# Patient Record
Sex: MALE | Race: WHITE | Employment: FULL TIME | URBAN - NONMETROPOLITAN AREA
[De-identification: names, ages, dates, MRNs, and addresses within clinical notes are randomized per-mention and may not be internally consistent; named-entity substitution may affect disease eponyms.]

---

## 2018-04-26 ENCOUNTER — OFFICE VISIT (OUTPATIENT)
Dept: PRIMARY CARE CLINIC | Age: 48
End: 2018-04-26
Payer: COMMERCIAL

## 2018-04-26 VITALS
TEMPERATURE: 98 F | DIASTOLIC BLOOD PRESSURE: 74 MMHG | HEIGHT: 68 IN | BODY MASS INDEX: 38.43 KG/M2 | SYSTOLIC BLOOD PRESSURE: 128 MMHG | OXYGEN SATURATION: 98 % | WEIGHT: 253.6 LBS | HEART RATE: 70 BPM

## 2018-04-26 DIAGNOSIS — S61.210A LACERATION OF RIGHT INDEX FINGER WITHOUT FOREIGN BODY WITHOUT DAMAGE TO NAIL, INITIAL ENCOUNTER: Primary | ICD-10-CM

## 2018-04-26 PROCEDURE — 99214 OFFICE O/P EST MOD 30 MIN: CPT | Performed by: FAMILY MEDICINE

## 2018-04-26 PROCEDURE — 90715 TDAP VACCINE 7 YRS/> IM: CPT | Performed by: FAMILY MEDICINE

## 2018-04-26 PROCEDURE — 90471 IMMUNIZATION ADMIN: CPT | Performed by: FAMILY MEDICINE

## 2018-04-26 RX ORDER — TAMSULOSIN HYDROCHLORIDE 0.4 MG/1
0.4 CAPSULE ORAL DAILY
COMMUNITY

## 2018-04-26 ASSESSMENT — PATIENT HEALTH QUESTIONNAIRE - PHQ9
2. FEELING DOWN, DEPRESSED OR HOPELESS: 0
SUM OF ALL RESPONSES TO PHQ QUESTIONS 1-9: 0
1. LITTLE INTEREST OR PLEASURE IN DOING THINGS: 0
SUM OF ALL RESPONSES TO PHQ9 QUESTIONS 1 & 2: 0

## 2018-04-26 ASSESSMENT — ENCOUNTER SYMPTOMS
ALLERGIC/IMMUNOLOGIC NEGATIVE: 1
RESPIRATORY NEGATIVE: 1
EYES NEGATIVE: 1
GASTROINTESTINAL NEGATIVE: 1

## 2021-04-02 ENCOUNTER — OFFICE VISIT (OUTPATIENT)
Dept: FAMILY MEDICINE CLINIC | Age: 51
End: 2021-04-02
Payer: COMMERCIAL

## 2021-04-02 VITALS
HEIGHT: 69 IN | SYSTOLIC BLOOD PRESSURE: 134 MMHG | DIASTOLIC BLOOD PRESSURE: 80 MMHG | WEIGHT: 263.2 LBS | TEMPERATURE: 97.8 F | BODY MASS INDEX: 38.98 KG/M2 | OXYGEN SATURATION: 98 % | HEART RATE: 82 BPM

## 2021-04-02 DIAGNOSIS — H66.001 NON-RECURRENT ACUTE SUPPURATIVE OTITIS MEDIA OF RIGHT EAR WITHOUT SPONTANEOUS RUPTURE OF TYMPANIC MEMBRANE: Primary | ICD-10-CM

## 2021-04-02 PROCEDURE — 99213 OFFICE O/P EST LOW 20 MIN: CPT | Performed by: NURSE PRACTITIONER

## 2021-04-02 RX ORDER — AMOXICILLIN 875 MG/1
875 TABLET, COATED ORAL 2 TIMES DAILY
Qty: 14 TABLET | Refills: 0 | Status: SHIPPED | OUTPATIENT
Start: 2021-04-02 | End: 2021-04-09

## 2021-04-02 ASSESSMENT — PATIENT HEALTH QUESTIONNAIRE - PHQ9
SUM OF ALL RESPONSES TO PHQ QUESTIONS 1-9: 0
SUM OF ALL RESPONSES TO PHQ9 QUESTIONS 1 & 2: 0
SUM OF ALL RESPONSES TO PHQ QUESTIONS 1-9: 0

## 2021-04-02 ASSESSMENT — ENCOUNTER SYMPTOMS
SORE THROAT: 0
RHINORRHEA: 0
COUGH: 0

## 2021-04-02 NOTE — PATIENT INSTRUCTIONS
Amoxil twice daily as directed. Follow up with primary care provider in 1 to 2 days if needed. Patient Education        Ear Infection (Otitis Media): Care Instructions  Overview     An ear infection may start with a cold and affect the middle ear (otitis media). It can hurt a lot. Most ear infections clear up on their own in a couple of days and do not need antibiotics. Also, antibiotics do not work against viruses, which may be the cause of your infection. Regular doses of pain relievers are the best way to reduce your fever and help you feel better. Follow-up care is a key part of your treatment and safety. Be sure to make and go to all appointments, and call your doctor if you are having problems. It's also a good idea to know your test results and keep a list of the medicines you take. How can you care for yourself at home? · Take pain medicines exactly as directed. ? If the doctor gave you a prescription medicine for pain, take it as prescribed. ? If you are not taking a prescription pain medicine, take an over-the-counter medicine, such as acetaminophen (Tylenol), ibuprofen (Advil, Motrin), or naproxen (Aleve). Read and follow all instructions on the label. ? Do not take two or more pain medicines at the same time unless the doctor told you to. Many pain medicines have acetaminophen, which is Tylenol. Too much acetaminophen (Tylenol) can be harmful. · Plan to take a full dose of pain reliever before bedtime. Getting enough sleep will help you get better. · Try a warm, moist washcloth on the ear. It may help relieve pain. · If your doctor prescribed antibiotics, take them as directed. Do not stop taking them just because you feel better. You need to take the full course of antibiotics. When should you call for help?    Call your doctor now or seek immediate medical care if:    · You have new or increasing ear pain.     · You have new or increasing pus or blood draining from your ear.     · You have a fever with a stiff neck or a severe headache. Watch closely for changes in your health, and be sure to contact your doctor if:    · You have new or worse symptoms.     · You are not getting better after taking an antibiotic for 2 days. Where can you learn more? Go to https://chpemartíneweb.F[employer]. org and sign in to your [employer] account. Enter A482 in the [employer] box to learn more about \"Ear Infection (Otitis Media): Care Instructions. \"     If you do not have an account, please click on the \"Sign Up Now\" link. Current as of: December 2, 2020               Content Version: 12.8  © 6914-2362 Healthwise, Incorporated. Care instructions adapted under license by [facility] ([facility]). If you have questions about a medical condition or this instruction, always ask your healthcare professional. [address] any warranty or liability for your use of this information.

## 2021-04-02 NOTE — PROGRESS NOTES
[facility] [place] [address] [address] [facility], APRN-CNP  [address]  Phone:  [phone]  Fax:  [phone]  Suzy Koch is a 48 y.o. male who presents today for his medical conditions/complaints as noted below. Suzy Koch c/o of Otalgia (right ear pain for the past 2 weeks, first time being seen, can still hear)      HPI:     Otalgia   There is pain in the right (right ear has felt different for 2 weeks, started becoming painful yesterday and worsening) ear. This is a new problem. The current episode started yesterday. The problem has been gradually worsening. There has been no fever. The fever has been present for less than 1 day. The pain is at a severity of 4/10. Pertinent negatives include no coughing, ear discharge, headaches, rhinorrhea or sore throat. Hearing loss: just different. He has tried nothing for the symptoms. There is no history of a chronic ear infection, hearing loss or a tympanostomy tube. Wt Readings from Last 3 Encounters:   04/02/21 263 lb 3.2 oz (119.4 kg)   04/26/18 253 lb 9.6 oz (115 kg)       Temp Readings from Last 3 Encounters:   04/02/21 97.8 °F (36.6 °C)   04/26/18 98 °F (36.7 °C) (Tympanic)       BP Readings from Last 3 Encounters:   04/02/21 134/80   04/26/18 128/74       Pulse Readings from Last 3 Encounters:   04/02/21 82   04/26/18 70              Past Medical History:   Diagnosis Date    Asthma       Past Surgical History:   Procedure Laterality Date    HERNIA REPAIR      TONSILLECTOMY       History reviewed. No pertinent family history.   Social History     Tobacco Use    Smoking status: Never Smoker    Smokeless tobacco: Never Used   Substance Use Topics    Alcohol use: No      Current Outpatient Medications   Medication Sig Dispense Refill    amoxicillin (AMOXIL) 875 MG tablet Take 1 tablet by mouth 2 times daily for 7 days 14 tablet 0    tamsulosin (FLOMAX) 0.4 MG capsule Take 0.4 mg by mouth daily      ibuprofen which may be the cause of your infection. Regular doses of pain relievers are the best way to reduce your fever and help you feel better. Follow-up care is a key part of your treatment and safety. Be sure to make and go to all appointments, and call your doctor if you are having problems. It's also a good idea to know your test results and keep a list of the medicines you take. How can you care for yourself at home? · Take pain medicines exactly as directed. ? If the doctor gave you a prescription medicine for pain, take it as prescribed. ? If you are not taking a prescription pain medicine, take an over-the-counter medicine, such as acetaminophen (Tylenol), ibuprofen (Advil, Motrin), or naproxen (Aleve). Read and follow all instructions on the label. ? Do not take two or more pain medicines at the same time unless the doctor told you to. Many pain medicines have acetaminophen, which is Tylenol. Too much acetaminophen (Tylenol) can be harmful. · Plan to take a full dose of pain reliever before bedtime. Getting enough sleep will help you get better. · Try a warm, moist washcloth on the ear. It may help relieve pain. · If your doctor prescribed antibiotics, take them as directed. Do not stop taking them just because you feel better. You need to take the full course of antibiotics. When should you call for help? Call your doctor now or seek immediate medical care if:    · You have new or increasing ear pain.     · You have new or increasing pus or blood draining from your ear.     · You have a fever with a stiff neck or a severe headache. Watch closely for changes in your health, and be sure to contact your doctor if:    · You have new or worse symptoms.     · You are not getting better after taking an antibiotic for 2 days. Where can you learn more? Go to https://chaddyeb.[employer]. org and sign in to your [employer] account.  Enter H919 in the [employer] box to learn more about \"Ear Infection (Otitis Media): Care Instructions. \"     If you do not have an account, please click on the \"Sign Up Now\" link. Current as of: December 2, 2020               Content Version: 12.8  © 2006-2021 Healthwise, Incorporated. Care instructions adapted under license by [facility] ([facility]). If you have questions about a medical condition or this instruction, always ask your healthcare professional. [place] [address] any warranty or liability for your use of this information. Patient/Caregiver instructed on use, benefit, and side effects of prescribed medications. All patient/parent/caregiver questions answered. Patient/parent/caregiver voiced understanding. Reviewed health maintenance. Instructed to continue current medications, diet and exercise. Patient agreed with treatment plan. Follow up as directed.            Electronically signed by SCARLETT Daniels NP on4/2/2021

## 2021-10-05 ENCOUNTER — OFFICE VISIT (OUTPATIENT)
Dept: OTOLARYNGOLOGY | Age: 51
End: 2021-10-05
Payer: COMMERCIAL

## 2021-10-05 VITALS
HEIGHT: 69 IN | HEART RATE: 93 BPM | OXYGEN SATURATION: 97 % | DIASTOLIC BLOOD PRESSURE: 88 MMHG | SYSTOLIC BLOOD PRESSURE: 138 MMHG | WEIGHT: 264 LBS | BODY MASS INDEX: 39.1 KG/M2

## 2021-10-05 DIAGNOSIS — H93.291 ABNORMAL AUDITORY PERCEPTION OF RIGHT EAR: Primary | ICD-10-CM

## 2021-10-05 PROCEDURE — 99204 OFFICE O/P NEW MOD 45 MIN: CPT | Performed by: OTOLARYNGOLOGY

## 2021-10-05 NOTE — PROGRESS NOTES
10/5/2021 2:31 PM EDT  Angela Painter (:  1970) is a 46 y.o. male,New patient, here for evaluation of the following chief complaint(s):  Otalgia (nw pt- pain in rt ear x 7 to 8 months with some hearing loss but sensitve to certain sounds)      ASSESSMENT/PLAN:  1. Abnormal auditory perception of right ear      1. Abnormal auditory perception of right ear  -     External Referral To Audiology  Discussed eustachian tube dysfunction and hyperacusis  Discussed that hyperacusis is commonly associated with lifestyle changes, stress, anxiety, bell's palsy, herpes zoster, superior canal dehiscence   Audiogram    No follow-ups on file. SUBJECTIVE/OBJECTIVE:  HPI   14-year-old man with 7 to 8-month history of change in his right ear. This is the third doctor's office that he has been in for evaluation. He was thought to have an effusion on the right side and was treated with Flonase without any improvement. He describes hearing sensitivity particularly with loud voices in a crowd or multiple voices. The right ear becomes sensitive and sometimes painful. Certain frequencies are very bothersome particularly in lower frequencies. Sometimes he feels like he is not hearing well. He has some mild allergy symptoms but does not take any medication on a regular basis. 4 to 5 years ago he had a fall with head trauma and nausea. He denies tinnitus and vertigo. He has a history of listening to loud music, shooting and hunting. He has routine audiograms for work but the most recent one was about 3 years ago. Specifically the onset of these symptoms seem to be associated with sleeping through an alarm that is very loud that he is used for 20 years. He feels like he slept through the alarm because he had a change in his hearing. It has somewhat improved since that initial event but he has ongoing symptoms that do not feel right on the right side. Frustrated by the ongoing symptoms.      Past Medical History: Diagnosis Date    Asthma      Past Surgical History:   Procedure Laterality Date    HERNIA REPAIR      TONSILLECTOMY       Social History     Tobacco History     Smoking Status  Never Smoker    Smokeless Tobacco Use  Never Used          Alcohol History     Alcohol Use Status  No          Drug Use     Drug Use Status  No          Sexual Activity     Sexually Active  Not Asked              History reviewed. No pertinent family history. Current Outpatient Medications   Medication Instructions    ibuprofen (ADVIL;MOTRIN) 800 mg, Oral, EVERY 8 HOURS PRN    tamsulosin (FLOMAX) 0.4 mg, DAILY     Allergies   Allergen Reactions    No Known Allergies        ENT ROS: positive for - hearing change    General: The patient is found to be alert and normally responsive male. Hearing: grossly normal   Voice: Clear   Skin: The skin has normal colour and turgor. Face: The facial contour is symmetric at rest and with movement. Ears: The pinnae have normal contours. AD: EAC clear, TM intact, no effusion/erythema/retraction   AS: EAC clear, TM intact, no effusion/erythema/retraction   Tympanogram   Type A AU   Eye: The ocular movements are full and symmetric, the conjunctiva is unremarkable; sclera are anicteric, pupillary response is symmetric. No nystagmus is found. Nose:   The external nasal contour is normal  Oral cavity:   The dentition is healthy. The oral mucosa is without lesions;  the tongue is symmetric with full mobility and is without fasciculation. The soft palate is symmetric. The oropharynx is unremarkable. Neck: The neck has a normal contour; no masses are found on palpation      An electronic signature was used to authenticate this note.     --Natalie Parra MD     10/5/2021 2:31 PM EDT

## 2021-11-09 ENCOUNTER — OFFICE VISIT (OUTPATIENT)
Dept: OTOLARYNGOLOGY | Age: 51
End: 2021-11-09
Payer: COMMERCIAL

## 2021-11-09 VITALS
HEART RATE: 85 BPM | OXYGEN SATURATION: 97 % | SYSTOLIC BLOOD PRESSURE: 128 MMHG | DIASTOLIC BLOOD PRESSURE: 78 MMHG | WEIGHT: 267.4 LBS | HEIGHT: 69 IN | BODY MASS INDEX: 39.6 KG/M2 | RESPIRATION RATE: 18 BRPM

## 2021-11-09 DIAGNOSIS — H93.291 ABNORMAL AUDITORY PERCEPTION OF RIGHT EAR: Primary | ICD-10-CM

## 2021-11-09 PROBLEM — N40.1 URINARY HESITANCY DUE TO BENIGN PROSTATIC HYPERPLASIA: Status: ACTIVE | Noted: 2017-12-19

## 2021-11-09 PROBLEM — Z86.19 HISTORY OF HERPES GENITALIS: Status: ACTIVE | Noted: 2019-12-23

## 2021-11-09 PROBLEM — N40.1 BENIGN PROSTATIC HYPERPLASIA WITH WEAK URINARY STREAM: Status: ACTIVE | Noted: 2020-01-27

## 2021-11-09 PROBLEM — R39.11 URINARY HESITANCY DUE TO BENIGN PROSTATIC HYPERPLASIA: Status: ACTIVE | Noted: 2017-12-19

## 2021-11-09 PROBLEM — R39.12 BENIGN PROSTATIC HYPERPLASIA WITH WEAK URINARY STREAM: Status: ACTIVE | Noted: 2020-01-27

## 2021-11-09 PROBLEM — R39.15 URINARY URGENCY: Status: ACTIVE | Noted: 2020-01-27

## 2021-11-09 PROCEDURE — 99213 OFFICE O/P EST LOW 20 MIN: CPT | Performed by: OTOLARYNGOLOGY

## 2021-11-09 NOTE — PROGRESS NOTES
2021 2:31 PM EDT  Rajendra Flores (:  1970) is a 46 y.o. male,New patient, here for evaluation of the following chief complaint(s):  Hearing Problem (5 wk follow up audio in [place])      ASSESSMENT/PLAN:  1. Abnormal auditory perception of right ear      1. Abnormal auditory perception of right ear  -     External Referral To ENT     Discussed eustachian tube dysfunction and hyperacusis and noise induced hearing loss   Discussed that hyperacusis is commonly associated with lifestyle changes, stress, anxiety, bell's palsy, herpes zoster, superior canal dehiscence     Recommend CT temporal bone to evaluate for superior canal dehiscence or any other underlying pathology of the right ear. Patient states that he does not believe a CT scan should be necessary. Explained that there is many areas of the ear that we are not able to see through traditional ear exam.  He continues to be very frustrated by the ongoing symptoms and the inability to explain what is going on beyond findings of the audiogram.  Discussed referral to neuro-otologist for further evaluation. No follow-ups on file. SUBJECTIVE/OBJECTIVE:  HPI   71-year-old man with 7 to 8-month history of change in his right ear. This is the third doctor's office that he has been in for evaluation. He was thought to have an effusion on the right side and was treated with Flonase without any improvement. He describes hearing sensitivity particularly with loud voices in a crowd or multiple voices. The right ear becomes sensitive and sometimes painful. Certain frequencies are very bothersome particularly in lower frequencies. Sometimes he feels like he is not hearing well or a fullness. He has some mild allergy symptoms but does not take any medication on a regular basis. 4 to 5 years ago he had a fall with head trauma and nausea. He denies tinnitus and vertigo. He has a history of listening to loud music, shooting and hunting.   He has routine audiograms for work but the most recent one was about 3 years ago. Specifically the onset of these symptoms seem to be associated with sleeping through an alarm that is very loud that he is used for 20 years. He feels like he slept through the alarm because he had a change in his hearing. It has somewhat improved since that initial event but he has ongoing symptoms that do not feel right on the right side. Frustrated by the ongoing symptoms. Today he follows up after an audiogram.  Audiogram 10/25/2021  Right hearing mild upsloping to normal at 500 Hz through 2000 Hz downsloping to moderate at 4000 Hz upsloping to normal at 8000 Hz with notch at 4000 Hz  Left hearing through 2000 Hz downsloping to moderate at 4000 Hz upsloping to normal at 8000 Hz with notch at 4000 Hz  Word recognition score 100% AU  Type A tympanometry AU    Past Medical History:   Diagnosis Date    Asthma      Past Surgical History:   Procedure Laterality Date    HERNIA REPAIR      TONSILLECTOMY       Social History     Tobacco History     Smoking Status  Never Smoker    Smokeless Tobacco Use  Never Used          Alcohol History     Alcohol Use Status  No          Drug Use     Drug Use Status  No          Sexual Activity     Sexually Active  Not Asked              History reviewed. No pertinent family history. Current Outpatient Medications   Medication Instructions    ibuprofen (ADVIL;MOTRIN) 800 mg, Oral, EVERY 8 HOURS PRN    tamsulosin (FLOMAX) 0.4 mg, DAILY     Allergies   Allergen Reactions    No Known Allergies        ENT ROS: positive for - hearing change    General: The patient is found to be alert and normally responsive male. Hearing: grossly normal   Voice: Clear   Skin: The skin has normal colour and turgor. Face: The facial contour is symmetric at rest and with movement. Ears: The pinnae have normal contours.     AD: EAC clear, TM intact, no effusion/erythema/retraction   AS: EAC clear, TM intact, no effusion/erythema/retraction   Tympanogram   Type A AU   Eye: The ocular movements are full and symmetric, the conjunctiva is unremarkable; sclera are anicteric, pupillary response is symmetric. No nystagmus is found. Nose:   The external nasal contour is normal  Oral cavity:   The dentition is healthy. The oral mucosa is without lesions;  the tongue is symmetric with full mobility and is without fasciculation. The soft palate is symmetric. The oropharynx is unremarkable. Neck: The neck has a normal contour; no masses are found on palpation      An electronic signature was used to authenticate this note.     --Mychal Patricio MD     11/9/2021 2:31 PM EDT

## 2023-12-27 ENCOUNTER — OFFICE VISIT (OUTPATIENT)
Dept: UROLOGY | Age: 53
End: 2023-12-27
Payer: COMMERCIAL

## 2023-12-27 VITALS
SYSTOLIC BLOOD PRESSURE: 138 MMHG | BODY MASS INDEX: 38.36 KG/M2 | HEART RATE: 77 BPM | DIASTOLIC BLOOD PRESSURE: 88 MMHG | OXYGEN SATURATION: 100 % | WEIGHT: 253.13 LBS | HEIGHT: 68 IN

## 2023-12-27 DIAGNOSIS — N13.8 BPH WITH OBSTRUCTION/LOWER URINARY TRACT SYMPTOMS: Primary | ICD-10-CM

## 2023-12-27 DIAGNOSIS — N39.0 RECURRENT UTI: ICD-10-CM

## 2023-12-27 DIAGNOSIS — N40.1 BPH WITH OBSTRUCTION/LOWER URINARY TRACT SYMPTOMS: Primary | ICD-10-CM

## 2023-12-27 DIAGNOSIS — N41.1 CHRONIC PROSTATITIS: ICD-10-CM

## 2023-12-27 PROCEDURE — 99204 OFFICE O/P NEW MOD 45 MIN: CPT | Performed by: UROLOGY

## 2023-12-27 RX ORDER — ALLOPURINOL 100 MG/1
100 TABLET ORAL DAILY
COMMUNITY
Start: 2023-08-09

## 2023-12-27 RX ORDER — HYDROCHLOROTHIAZIDE 12.5 MG/1
12.5 TABLET ORAL DAILY
COMMUNITY
Start: 2023-07-21

## 2023-12-27 RX ORDER — ACETAMINOPHEN 160 MG
TABLET,DISINTEGRATING ORAL
COMMUNITY

## 2023-12-27 RX ORDER — ALFUZOSIN HYDROCHLORIDE 10 MG/1
10 TABLET, EXTENDED RELEASE ORAL DAILY
Qty: 90 TABLET | Refills: 3 | Status: SHIPPED | OUTPATIENT
Start: 2023-12-27

## 2023-12-27 RX ORDER — AMLODIPINE BESYLATE 5 MG/1
5 TABLET ORAL DAILY
COMMUNITY
Start: 2023-05-11

## 2023-12-27 RX ORDER — VALACYCLOVIR HYDROCHLORIDE 500 MG/1
500 TABLET, FILM COATED ORAL 2 TIMES DAILY
COMMUNITY
Start: 2023-07-21

## 2023-12-27 RX ORDER — CIPROFLOXACIN 500 MG/1
500 TABLET, FILM COATED ORAL 2 TIMES DAILY
Qty: 28 TABLET | Refills: 0 | Status: SHIPPED | OUTPATIENT
Start: 2023-12-27 | End: 2024-01-10

## 2023-12-27 RX ORDER — SULFAMETHOXAZOLE AND TRIMETHOPRIM 800; 160 MG/1; MG/1
1 TABLET ORAL 2 TIMES DAILY
Qty: 28 TABLET | Refills: 0 | Status: SHIPPED | OUTPATIENT
Start: 2023-12-27 | End: 2023-12-27 | Stop reason: SINTOL

## 2023-12-27 NOTE — PROGRESS NOTES
within normal limits  Musculoskeletal: Normal range of motion       Assessment and Plan      1. BPH with obstruction/lower urinary tract symptoms    2. Recurrent UTI    3. Chronic prostatitis           Trial of Uroxatral  Cranberry pills/D-mannose  cipro 2 more weeks  60 oz daily fluids    May need cystoscopy in the future      Fu 2ish months      Prescriptions Ordered:  Orders Placed This Encounter   Medications    alfuzosin (UROXATRAL) 10 MG extended release tablet     Sig: Take 1 tablet by mouth daily     Dispense:  90 tablet     Refill:  3    DISCONTD: sulfamethoxazole-trimethoprim (BACTRIM DS) 800-160 MG per tablet     Sig: Take 1 tablet by mouth 2 times daily for 14 days     Dispense:  28 tablet     Refill:  0    ciprofloxacin (CIPRO) 500 MG tablet     Sig: Take 1 tablet by mouth 2 times daily for 14 days     Dispense:  28 tablet     Refill:  0      Orders Placed:  No orders of the defined types were placed in this encounter. Arsalan Mesa MD    No follow-ups on file.       Cale Russell MD  [facility]